# Patient Record
Sex: MALE | Race: WHITE | Employment: OTHER | URBAN - METROPOLITAN AREA
[De-identification: names, ages, dates, MRNs, and addresses within clinical notes are randomized per-mention and may not be internally consistent; named-entity substitution may affect disease eponyms.]

---

## 2018-06-18 ENCOUNTER — OFFICE VISIT (OUTPATIENT)
Dept: VASCULAR SURGERY | Age: 83
End: 2018-06-18

## 2018-06-18 VITALS
BODY MASS INDEX: 28.19 KG/M2 | WEIGHT: 186 LBS | RESPIRATION RATE: 18 BRPM | HEART RATE: 68 BPM | HEIGHT: 68 IN | DIASTOLIC BLOOD PRESSURE: 74 MMHG | SYSTOLIC BLOOD PRESSURE: 136 MMHG

## 2018-06-18 DIAGNOSIS — M79.604 LEG PAIN, BILATERAL: Primary | ICD-10-CM

## 2018-06-18 DIAGNOSIS — I65.23 CAROTID STENOSIS, ASYMPTOMATIC, BILATERAL: ICD-10-CM

## 2018-06-18 DIAGNOSIS — M79.605 LEG PAIN, BILATERAL: Primary | ICD-10-CM

## 2018-06-18 RX ORDER — DESVENLAFAXINE SUCCINATE 50 MG/1
1 TABLET, EXTENDED RELEASE ORAL
COMMUNITY
Start: 2016-11-22

## 2018-06-18 RX ORDER — TAMSULOSIN HYDROCHLORIDE 0.4 MG/1
0.4 CAPSULE ORAL
COMMUNITY
Start: 2011-04-27

## 2018-06-18 RX ORDER — AMLODIPINE AND VALSARTAN 5; 320 MG/1; MG/1
TABLET ORAL
COMMUNITY
Start: 2018-05-25

## 2018-06-18 RX ORDER — SIMVASTATIN 20 MG/1
20 TABLET, FILM COATED ORAL
COMMUNITY
Start: 2016-11-22

## 2018-06-18 RX ORDER — ASPIRIN 81 MG/1
81 TABLET ORAL
COMMUNITY

## 2018-06-18 RX ORDER — ESCITALOPRAM OXALATE 20 MG/1
1 TABLET ORAL
COMMUNITY
Start: 2009-01-27

## 2018-06-18 RX ORDER — METOPROLOL TARTRATE 50 MG/1
50 TABLET ORAL
COMMUNITY
Start: 2009-01-27

## 2018-06-18 NOTE — PROGRESS NOTES
Jae Bernabe    Chief Complaint   Patient presents with    Leg Pain       History and Physical    Mr. Mireille Langston is an 80year old male who was referred to our office for bilateral leg pain. He states the pain is cramping and occurs at rest, especially at night, and are not related to any physical activity. He specifically denies claudication symptoms. He also reports carotid stenosis and says he was being seen by a physician in [facility] for this but that physician is no longer practicing. Mr. Mireille Langston has a history of stroke in 2008, but denies current stroke symptoms, specifically one-sided weakness, facial droop, and difficulty speaking. Mr. Mireille Langston does not smoke. Past Medical History:   Diagnosis Date    CAD (coronary artery disease)     Calculus of kidney     Cancer ([facility] [address]     bladder    Hypercholesterolemia     Hypertension     PUD (peptic ulcer disease)     Stroke ([facility] [address]      There is no problem list on file for this patient. Past Surgical History:   Procedure Laterality Date    CARDIAC SURG PROCEDURE UNLIST      HX HEENT      HX PROSTATECTOMY      HX UROLOGICAL       Current Outpatient Prescriptions   Medication Sig Dispense Refill    aspirin delayed-release 81 mg tablet Take 81 mg by mouth.  desvenlafaxine succinate (PRISTIQ) 50 mg ER tablet Take 1 Tab by mouth.  escitalopram oxalate (LEXAPRO) 20 mg tablet Take 1 Tab by mouth.  metoprolol tartrate (LOPRESSOR) 50 mg tablet 50 mg.      simvastatin (ZOCOR) 20 mg tablet 20 mg.      tamsulosin (FLOMAX) 0.4 mg capsule 0.4 mg.      amLODIPine-valsartan (EXFORGE) 5-320 mg per tablet        Allergies   Allergen Reactions    Penicillin G Hives     Social History     Social History    Marital status:      Spouse name: N/A    Number of children: N/A    Years of education: N/A     Occupational History    Not on file.      Social History Main Topics    Smoking status: Never Smoker    Smokeless tobacco: Current User Types: Chew    Alcohol use Yes    Drug use: No    Sexual activity: Yes     Other Topics Concern    Not on file     Social History Narrative    No narrative on file      Family History   Problem Relation Age of Onset    Hypertension Maternal Grandmother        Review of Systems    Review of Systems   Constitutional: Negative for chills, fever, malaise/fatigue and weight loss. HENT: Negative for ear pain and hearing loss. Eyes: Negative for blurred vision, pain and discharge. Respiratory: Negative for cough, hemoptysis, sputum production and shortness of breath. Cardiovascular: Negative for chest pain, palpitations, orthopnea, claudication and leg swelling. Gastrointestinal: Negative for heartburn, nausea and vomiting. Genitourinary: Negative for dysuria and urgency. Musculoskeletal:        +muscle cramping   Skin: Negative for itching and rash. Neurological: Negative for dizziness, tingling, weakness and headaches. Endo/Heme/Allergies: Does not bruise/bleed easily. Psychiatric/Behavioral: Negative for depression.        Physical Exam:    Visit Vitals    /74 (BP 1 Location: Left arm, BP Patient Position: Sitting)    Pulse 68    Resp 18    Ht 5' 8\" (1.727 m)    Wt 186 lb (84.4 kg)    BMI 28.28 kg/m2      Physical Examination: General appearance - alert, well appearing, and in no distress  Mental status - alert, oriented to person, place, and time, normal mood, behavior, speech, dress, motor activity, and thought processes  Eyes - left eye normal, right eye normal  Ears - right ear normal, left ear normal  Mouth - mucous membranes mois  Chest - clear to auscultation, no wheezes  Heart - normal rate and regular rhythm  Abdomen - soft, nontender, nondistended  Neurological - alert, oriented, normal speech, no focal findings or movement disorder noted, motor and sensory grossly normal bilaterally  Musculoskeletal - no obvious deformity  Extremities - warm and well-perfused, palpable DP pulses bilaterally, +sensory/+motor function, no edema  Skin - normal coloration and turgor, no rashes, no suspicious skin lesions noted      Impression and Plan:    ICD-10-CM ICD-9-CM    1. Leg pain, bilateral M79.604 729.5     M79.605     2. Carotid stenosis, asymptomatic, bilateral I65.23 433.10 DUPLEX CAROTID BILATERAL     433.30      Orders Placed This Encounter    DUPLEX CAROTID BILATERAL    aspirin delayed-release 81 mg tablet    desvenlafaxine succinate (PRISTIQ) 50 mg ER tablet    escitalopram oxalate (LEXAPRO) 20 mg tablet    metoprolol tartrate (LOPRESSOR) 50 mg tablet    simvastatin (ZOCOR) 20 mg tablet    tamsulosin (FLOMAX) 0.4 mg capsule    amLODIPine-valsartan (EXFORGE) 5-320 mg per tablet     I had a discussion with Mr. Wanetta Gowers regarding his leg pain. The description of his symptoms and his physical exam do not suggest PAD. I suggested he drink plenty of water to help with the cramping. I also had a discussion with Mr. Wanetta Gowers regarding his recent carotid study showing <50% stenosis on the left and 50-69% stenosis on the right. As he does not have any acute symptoms, we would re-check this in one year with repeat carotid duplex. We will follow up with Mr. Wanetta Gowers in one year. Follow-up Disposition:  Return in about 1 year (around 6/18/2019). The treatment plan was reviewed with the patient in detail. The patient voiced understanding of this plan and all questions and concerns were addressed. The patient agrees with this plan. We discussed the signs and symptoms that would require earlier attention or intervention. The patient was given educational material related to his/her visit and the patient has voiced understanding of the material.     I appreciate the opportunity to participate in the care of your patient. I will be sure to keep you informed of any subsequent changes in the treatment plan.   If you have any questions or concerns, please feel free to contact me.    Eliseo Otoole NP    PLEASE NOTE:  This document has been produced using voice recognition software. Unrecognized errors in transcription may be present.

## 2018-06-18 NOTE — PROGRESS NOTES
Order for bilateral carotid  placed per Verbal order from [facility] . Pt verbalized understanding .  Had to renter due to cupid

## 2019-06-10 ENCOUNTER — HOSPITAL ENCOUNTER (OUTPATIENT)
Dept: VASCULAR SURGERY | Age: 84
Discharge: HOME | End: 2019-06-10
Attending: NURSE PRACTITIONER
Payer: MEDICARE

## 2019-06-10 DIAGNOSIS — I65.23 CAROTID STENOSIS, ASYMPTOMATIC, BILATERAL: ICD-10-CM

## 2019-06-10 DIAGNOSIS — M79.604 LEG PAIN, BILATERAL: ICD-10-CM

## 2019-06-10 DIAGNOSIS — M79.605 LEG PAIN, BILATERAL: ICD-10-CM

## 2019-06-10 LAB
LEFT CCA DIST DIAS: 17.7 CM/S
LEFT CCA DIST SYS: 110.6 CM/S
LEFT CCA PROX DIAS: 10.7 CM/S
LEFT CCA PROX SYS: 115.2 CM/S
LEFT ECA DIAS: 0 CM/S
LEFT ECA SYS: 159.4 CM/S
LEFT ICA DIST DIAS: 17.6 CM/S
LEFT ICA DIST SYS: 70.7 CM/S
LEFT ICA MID DIAS: 15.3 CM/S
LEFT ICA MID SYS: 112.9 CM/S
LEFT ICA PROX DIAS: 20 CM/S
LEFT ICA PROX SYS: 166.4 CM/S
LEFT ICA/CCA SYS: 1.5
LEFT SUBCLAVIAN DIAS: 0 CM/S
LEFT SUBCLAVIAN SYS: 283.2 CM/S
LEFT VERTEBRAL DIAS: 7.5 CM/S
LEFT VERTEBRAL SYS: 47.6 CM/S
RIGHT CCA DIST DIAS: 12.5 CM/S
RIGHT CCA DIST SYS: 73.8 CM/S
RIGHT CCA PROX DIAS: 11.2 CM/S
RIGHT CCA PROX SYS: 86.9 CM/S
RIGHT ECA DIAS: 7.25 CM/S
RIGHT ECA SYS: 260.9 CM/S
RIGHT ICA DIST DIAS: 26.8 CM/S
RIGHT ICA DIST SYS: 107.6 CM/S
RIGHT ICA MID DIAS: 11 CM/S
RIGHT ICA MID SYS: 93.8 CM/S
RIGHT ICA PROX DIAS: 32.3 CM/S
RIGHT ICA PROX SYS: 171.7 CM/S
RIGHT ICA/CCA SYS: 2.3
RIGHT SUBCLAVIAN DIAS: 0 CM/S
RIGHT SUBCLAVIAN SYS: 224.7 CM/S
RIGHT VERTEBRAL DIAS: 9.84 CM/S
RIGHT VERTEBRAL SYS: 62.9 CM/S

## 2019-06-10 PROCEDURE — 93880 EXTRACRANIAL BILAT STUDY: CPT
